# Patient Record
Sex: MALE | Race: WHITE | Employment: FULL TIME | URBAN - METROPOLITAN AREA
[De-identification: names, ages, dates, MRNs, and addresses within clinical notes are randomized per-mention and may not be internally consistent; named-entity substitution may affect disease eponyms.]

---

## 2022-01-05 ENCOUNTER — OFFICE VISIT (OUTPATIENT)
Dept: URGENT CARE | Facility: CLINIC | Age: 37
End: 2022-01-05
Payer: COMMERCIAL

## 2022-01-05 VITALS — HEART RATE: 93 BPM | TEMPERATURE: 97.1 F | OXYGEN SATURATION: 97 % | RESPIRATION RATE: 16 BRPM | WEIGHT: 220 LBS

## 2022-01-05 DIAGNOSIS — U07.1 COVID-19: Primary | ICD-10-CM

## 2022-01-05 PROCEDURE — 99203 OFFICE O/P NEW LOW 30 MIN: CPT | Performed by: FAMILY MEDICINE

## 2022-01-05 NOTE — LETTER
January 5, 2022     Patient: Jyoti Mcintosh   YOB: 1985   Date of Visit: 1/5/2022       To Whom It May Concern:    Jyoti Mcintosh was evaluated in my office on 01/05/2022  Please excuse his absence  Continues to have signs and symptoms of COVID-19 infection and should continue his isolation until asymptomatic  If you have any questions or concerns, please don't hesitate to call           Sincerely,        Linda Berrios MD

## 2022-01-05 NOTE — PROGRESS NOTES
[id number]        NAME: Jose Ascencio is a 39 y o  male  : 1985    MRN: [id number]  DATE: 2022  TIME: 9:49 AM    Assessment and Plan   COVID-19 [U07 1]  1  COVID-19       Advised on continue supportive measures  Work note given  Patient Instructions     Follow up with PCP in 3-5 days  Proceed to  ER if symptoms worsen  Chief Complaint     Chief Complaint   Patient presents with    Cold Like Symptoms     Covid + last week, c/o body aches, chills HA, fever, left ear clogged, feels throat is "restricted", cough         History of Present Illness       22-year-old male presents today having tested positive for COVID-19 last week  Reports persistent symptoms of intermittent fevers, chills, fatigue, anorexia, choking sensation and generalized myalgias  Has been hydrating taking OTC pain relievers as needed  Review of Systems   Review of Systems   Constitutional: Positive for appetite change, chills, fatigue and fever  HENT: Positive for ear pain (left ear discomfort)  Respiratory: Positive for choking  Gastrointestinal: Positive for nausea  Musculoskeletal: Positive for myalgias  Current Medications     No current outpatient medications on file  Current Allergies     Allergies as of 2022    (No Known Allergies)            The following portions of the patient's history were reviewed and updated as appropriate: allergies, current medications, past family history, past medical history, past social history, past surgical history and problem list      History reviewed  No pertinent past medical history  History reviewed  No pertinent surgical history  No family history on file  Medications have been verified  Objective   Pulse 93   Temp (!) 97 1 °F (36 2 °C)   Resp 16   Wt 99 8 kg (220 lb)   SpO2 97%   No LMP for male patient  Physical Exam     Physical Exam  Vitals and nursing note reviewed     Constitutional:       General: He is in acute distress (Fatigue)  Appearance: Normal appearance  He is not ill-appearing, toxic-appearing or diaphoretic  HENT:      Head: Normocephalic and atraumatic  Eyes:      General:         Right eye: No discharge  Left eye: No discharge  Conjunctiva/sclera: Conjunctivae normal    Cardiovascular:      Rate and Rhythm: Normal rate and regular rhythm  Pulmonary:      Effort: Pulmonary effort is normal  No respiratory distress  Breath sounds: Normal breath sounds  No wheezing, rhonchi or rales  Skin:     General: Skin is warm  Findings: No erythema  Neurological:      General: No focal deficit present  Mental Status: He is alert and oriented to person, place, and time  Psychiatric:         Mood and Affect: Mood normal          Behavior: Behavior normal          Thought Content:  Thought content normal          Judgment: Judgment normal